# Patient Record
Sex: MALE | Race: WHITE | Employment: FULL TIME | URBAN - METROPOLITAN AREA
[De-identification: names, ages, dates, MRNs, and addresses within clinical notes are randomized per-mention and may not be internally consistent; named-entity substitution may affect disease eponyms.]

---

## 2018-08-28 ENCOUNTER — TRANSFERRED RECORDS (OUTPATIENT)
Dept: HEALTH INFORMATION MANAGEMENT | Facility: CLINIC | Age: 43
End: 2018-08-28

## 2021-01-14 ENCOUNTER — TRANSFERRED RECORDS (OUTPATIENT)
Dept: HEALTH INFORMATION MANAGEMENT | Facility: CLINIC | Age: 46
End: 2021-01-14

## 2021-04-07 ENCOUNTER — MEDICAL CORRESPONDENCE (OUTPATIENT)
Dept: HEALTH INFORMATION MANAGEMENT | Facility: CLINIC | Age: 46
End: 2021-04-07

## 2021-04-07 ENCOUNTER — TRANSFERRED RECORDS (OUTPATIENT)
Dept: HEALTH INFORMATION MANAGEMENT | Facility: CLINIC | Age: 46
End: 2021-04-07

## 2021-04-13 ENCOUNTER — TELEPHONE (OUTPATIENT)
Dept: OTHER | Facility: CLINIC | Age: 46
End: 2021-04-13

## 2021-04-13 NOTE — TELEPHONE ENCOUNTER
Referral received via fax on 4/7/21.  Sent to [facility] to be scanned.    Pt referred to [facility] by Dr. José Lu ([facility]) for DVT followup.    Per review of notes, hx of 3 blood clots, most recent left leg DVT, on blood thinners since January 2021.    Pt needs to be scheduled for in person consult with Vascular Medicine.  Will route to scheduling to coordinate an appointment at next available.    Appt note: Ref by Dr. José Lu ([facility]) for DVT followup; hx of 3 blood clots, most recent left leg DVT, on blood thinners since January 2021.    Elizabeth Davalos, TRACIN, RN-[facility] [place]

## 2021-04-28 ENCOUNTER — OFFICE VISIT (OUTPATIENT)
Dept: OTHER | Facility: CLINIC | Age: 46
End: 2021-04-28
Attending: INTERNAL MEDICINE

## 2021-04-28 VITALS
WEIGHT: 189 LBS | OXYGEN SATURATION: 97 % | BODY MASS INDEX: 26.46 KG/M2 | SYSTOLIC BLOOD PRESSURE: 119 MMHG | HEART RATE: 50 BPM | HEIGHT: 71 IN | DIASTOLIC BLOOD PRESSURE: 78 MMHG | RESPIRATION RATE: 16 BRPM

## 2021-04-28 DIAGNOSIS — I83.893 VARICOSE VEINS OF BILATERAL LOWER EXTREMITIES WITH OTHER COMPLICATIONS: ICD-10-CM

## 2021-04-28 DIAGNOSIS — Z86.711 HISTORY OF PULMONARY EMBOLISM: ICD-10-CM

## 2021-04-28 DIAGNOSIS — G08 DURAL VENOUS SINUS THROMBOSIS: ICD-10-CM

## 2021-04-28 DIAGNOSIS — I82.552 CHRONIC DEEP VEIN THROMBOSIS (DVT) OF LEFT PERONEAL VEIN (H): Primary | ICD-10-CM

## 2021-04-28 PROCEDURE — 99205 OFFICE O/P NEW HI 60 MIN: CPT | Performed by: INTERNAL MEDICINE

## 2021-04-28 PROCEDURE — G0463 HOSPITAL OUTPT CLINIC VISIT: HCPCS

## 2021-04-28 RX ORDER — MULTIPLE VITAMINS W/ MINERALS TAB 9MG-400MCG
1 TAB ORAL DAILY
COMMUNITY

## 2021-04-28 RX ORDER — ERGOCALCIFEROL 1.25 MG/1
50000 CAPSULE, LIQUID FILLED ORAL DAILY
COMMUNITY

## 2021-04-28 SDOH — HEALTH STABILITY: MENTAL HEALTH: HOW OFTEN DO YOU HAVE A DRINK CONTAINING ALCOHOL?: NOT ASKED

## 2021-04-28 SDOH — HEALTH STABILITY: MENTAL HEALTH: HOW OFTEN DO YOU HAVE 6 OR MORE DRINKS ON ONE OCCASION?: NOT ASKED

## 2021-04-28 SDOH — HEALTH STABILITY: MENTAL HEALTH: HOW MANY STANDARD DRINKS CONTAINING ALCOHOL DO YOU HAVE ON A TYPICAL DAY?: NOT ASKED

## 2021-04-28 ASSESSMENT — MIFFLIN-ST. JEOR: SCORE: 1764.43

## 2021-04-28 NOTE — PROGRESS NOTES
"Jose Barajas is a 45 year old male who presents for:  Chief Complaint   Patient presents with     RECHECK      Ref by Dr. José Lu ([facility]) for DVT followup; hx of 3 blood clots, most recent left leg DVT, on blood thinners since January 2021 *nh        Vitals:    Vitals:    04/28/21 1043 04/28/21 1044   BP: 119/71 119/78   BP Location: Right arm Left arm   Patient Position: Chair Chair   Cuff Size: Adult Regular Adult Regular   Pulse: 50    Resp: 16    SpO2: 97%    Weight: 189 lb (85.7 kg)    Height: 5' 11\" (1.803 m)        BMI:  Estimated body mass index is 26.36 kg/m  as calculated from the following:    Height as of this encounter: 5' 11\" (1.803 m).    Weight as of this encounter: 189 lb (85.7 kg).    Pain Score:  Data Unavailable        Sherri Costa CMA    "

## 2021-04-28 NOTE — PROGRESS NOTES
[facility] INITIAL VASCULAR MEDICINE CONSULT    ( NEW PATIENT VISIT)      PRIMARY HEALTH CARE PROVIDER:  José Lu Edward, MD      REFERRING HEALTH CARE PROVIDER;   José Lu Edward, MD      REASON FOR CONSULT:  Evaluation and management of recurrent DVT and PE with HX of dural sinus thrombosis in early 2000 treated with warfarin.       HPI: Jose Barajas is a 45 year old very pleasant male new patient visit here today for evaluation and management of multiple thromboembolic events.  Initially developed dural venous sinus thrombosis in early 2000's evaluated extensively [facility] per patient all of the work-up negative the even sent some of the special labs to [place] etc. and he took warfarin for 6+ months and then he did well.  He developed pulmonary embolism in 2018 and treated with warfarin for 6+ months at that time and new DVT at the time.  Limited hypercoagulable studies at the time negative at [facility]. he recently fractured his left foot and developed below-knee DVT in the peroneal vein January 2021 and taking Eliquis 5 mg twice a day since then and repeat venous duplex in April 8, 2021 DVT resolved.    He is non-smoker, no family history for first-degree relatives with a DVT or PE  No personal history of malignancy      Reviewed available records in the [facility] and care everywhere and updated chart    PAST MEDICAL HISTORY  Past Medical History:   Diagnosis Date     Chronic deep vein thrombosis (DVT) of left peroneal vein (H), Recurrent      Dural venous sinus thrombosis      Other pulmonary embolism without acute cor pulmonale (H)        CURRENT MEDICATIONS  apixaban ANTICOAGULANT (ELIQUIS) 5 MG tablet, Take 5 mg by mouth  calcium carbonate-vitamin D (OS-CORINNE) 500-400 MG-UNIT tablet, Take 1 tablet by mouth daily  multivitamin w/minerals (MULTI-VITAMIN) tablet, Take 1 tablet by mouth daily  vitamin D2 (ERGOCALCIFEROL) 09599 units (1250 mcg) capsule, Take  50,000 Units by mouth daily    No current facility-administered medications on file prior to visit.       PAST SURGICAL HISTORY:  Past Surgical History:   Procedure Laterality Date     OTHER SURGICAL HISTORY Left     foot FX Nov 2020 , closed reduction.       ALLERGIES   No Known Allergies    FAMILY HISTORY  Family History   Problem Relation Age of Onset     Cerebrovascular Disease Paternal Grandmother         age 89     Cerebrovascular Disease Paternal Uncle         80s age       VASCULAR FAMILY HISTORY  1st order relative with atherosclerotic PAD: No  1st order relative with AAA: No  Family history of Familial Hyperlipidemia No  Family History of Hypercoagulable state:No    VASCULAR RISK FACTORS  1. Diabetes:No   2. Smoking: has never smoked.  3. HTN: normotensive  4.Hyperlipidemia: No      SOCIAL HISTORY  Social History     Socioeconomic History     Marital status: Single     Spouse name: Not on file     Number of children: Not on file     Years of education: Not on file     Highest education level: Not on file   Occupational History     Not on file   Social Needs     Financial resource strain: Not on file     Food insecurity     Worry: Not on file     Inability: Not on file     Transportation needs     Medical: Not on file     Non-medical: Not on file   Tobacco Use     Smoking status: Never Smoker     Smokeless tobacco: Never Used   Substance and Sexual Activity     Alcohol use: Not on file     Comment: socially     Drug use: Never     Sexual activity: Not on file   Lifestyle     Physical activity     Days per week: Not on file     Minutes per session: Not on file     Stress: Not on file   Relationships     Social connections     Talks on phone: Not on file     Gets together: Not on file     Attends Alevism service: Not on file     Active member of club or organization: Not on file     Attends meetings of clubs or organizations: Not on file     Relationship status: Not on file     Intimate partner violence      "Fear of current or ex partner: Not on file     Emotionally abused: Not on file     Physically abused: Not on file     Forced sexual activity: Not on file   Other Topics Concern     Not on file   Social History Narrative     Not on file       ROS:   General: No change in weight, sleep or appetite.  Normal energy.  No fever or chills  Eyes: Negative for vision changes or eye problems  ENT: No problems with ears, nose or throat.  No difficulty swallowing.  Resp: No coughing, wheezing or shortness of breath  CV: No chest pains or palpitations  GI: No nausea, vomiting,  heartburn, abdominal pain, diarrhea, constipation or change in bowel habits  : No urinary frequency or dysuria, bladder or kidney problems  Musculoskeletal: No significant muscle or joint pains  Neurologic: No headaches, numbness, tingling, weakness, problems with balance or coordination  Psychiatric: No problems with anxiety, depression or mental health  Heme/immune/allergy: No history of bleeding or clotting problems or anemia.  No allergies or immune system problems  Endocrine: No history of thyroid disease, diabetes or other endocrine disorders  Skin: No rashes,worrisome lesions or skin problems  Vascular:  No claudication, lifestyle limiting or otherwise; no ischemic rest pain; no non-healing ulcers. No weakness, No loss of sensation    History of left lower extremity peroneal vein DVT developed in January 2021 after foot fracture with closed reduction  Bilateral lower extremity varicose veins no previous intervention    EXAM:  /78 (BP Location: Left arm, Patient Position: Chair, Cuff Size: Adult Regular)   Pulse 50   Resp 16   Ht 5' 11\" (1.803 m)   Wt 189 lb (85.7 kg)   SpO2 97%   BMI 26.36 kg/m    In general, the patient is a pleasant male in no apparent distress.    HEENT: NC/AT.  PERRLA.  EOMI.  Sclerae white, not injected.  Nares clear.  Pharynx without erythema or exudate.  Dentition intact.    Neck: No adenopathy.  No " thyromegaly. Carotids +2/2 bilaterally without bruits.  No jugular venous distension.   Heart: RRR. Normal S1, S2 splits physiologically. No murmur, rub, click, or gallop. The PMI is in the 5th ICS in the midclavicular line. There is no heave.    Lungs: CTA.  No ronchi, wheezes, rales.  No dullness to percussion.   Abdomen: Soft, nontender, nondistended. No organomegaly. No AAA.  No bruits.   Extremities: Vascular:  Bilateral palpable symmetrical femoral popliteal, DP and PT pulses  Bilateral lower extremity varicose veins with CEAP 4 CVI  No foot ulcers or leg ulcers  No leg edema      Labs:    Procedures:   HISTORY:  Dyspnea, cough, pleuritic chest pain.    TECHNIQUE:  Intravenous contrast enhanced CT of the chest. 72 mL of Omnipaque 350 intravenous contrast administered.    COMPARISON:  Chest radiograph 08/20/2018.    FINDINGS:  The study is positive for pulmonary emboli. Filling defects compatible with pulmonary emboli are noted within right lower lobe posterior subsegmental pulmonary arterial branches on images #190 through 199 of series 4 for example. There is also a filling defect compatible pulmonary embolism within the inferior lingular segmental to subsegmental pulmonary arterial branches as seen on image #142-165 series 4. There is no large or central pulmonary embolus. No thoracic aortic aneurysm or dissection. No significant pericardial effusion.  -  Combination of atelectasis and consolidation within the left greater than right lower lobes. Additional patchy areas of centrilobular opacity within both lungs likely indicating small airways disease. There is no pneumothorax. A trace amount of left pleural fluid is present bilaterally. Mildly prominent left hilar lymph node on image #151 of series 4 measures approximately 1 cm size.   -  No acute bony abnormality.    IMPRESSION:  1. Small bilateral pulmonary emboli.  2. Combination of consolidation and atelectasis within the lower lobes, likely related to  pneumonia. These are more extensive than the pulmonary parenchymal distributions supplied by the small vessels affected by pulmonary emboli.   3. Additional areas of centrilobular opacity within both lungs suggesting small airways disease and endobronchial spread of infection or infected distal airways disease.  4. Mild left hilar adenopathy.  5. Findings were discussed with Dr. Rosales on 08/22/2018 at 7:54 hours.    Please note that all CT scans at this facility use dose modulation, iterative reconstruction, and/or weight-based dosing when appropriate to reduce radiation dose to as low as reasonably achievable.    Dictated by Daniel Kimble MD @ Aug 22 2018  7:45AM    Signed by Dr. Daniel Kimble @ Aug 22 2018  7:57AM      [facility] 4/8/2021  Reviewed outside imaging sudies in the epic and scanned reports etc.    INDICATION:  History of PE and left peroneal vein thrombus.    TECHNIQUE:  Grayscale two-dimensional ultrasound without and with compression as well as color-flow and spectral Doppler of the left lower extremity veins.    COMPARISON:  Left lower extremity venous Doppler study of 01/14/2021.     FINDINGS:  Normal compressibility of and flow within the left common femoral, superficial femoral, popliteal, posterior tibial, profunda, and greater saphenous veins is demonstrated.  No thrombus is identified.  The right common femoral vein is clear.    IMPRESSION:  Negative left lower extremity venous Doppler study.      Dictated by Sean Alberts MD @ Apr 8 2021  3:48PM    INDICATION:  Left leg pain and swelling     TECHNIQUE:  Ultrasound venous duplex lower left extremity.  Compression venous exam was performed using gray-scale and color Doppler imaging.     COMPARISON:  August 28, 2018    FINDINGS:  Sonographic imaging demonstrates DVT within the left peroneal vein extending from the mid to distal calf.     The left common femoral, superficial femoral, popliteal, posterior tibial and greater saphenous and the  contralateral right common femoral veins are compressible with normal color Doppler blood flow.         IMPRESSION:  DVT within the left peroneal vein. Findings discussed with AYE Zarate at 6:33 p.m. on January 14, 2021.    Assessment and Plan:    1. Chronic deep vein thrombosis (DVT) of left peroneal vein (H) ( HX of recurrent DVT) currently on eliquis     2. History of pulmonary embolism, 8/2018 ( treated with warfarin 6 plus months)     3.  Hx of Dural venous sinus thrombosis ( treated with warfarin 6 plus months extensive w/u negative at that time)     4.  Bilateral lower extremity varicose veins CEAP 4 CVI      This is a very pleasant 45-year-old male with history of recurrent thromboembolic events early 2000's dural venous sinus thrombosis treated with warfarin for 6+ months then followed by 2018 pulmonary embolism treated with warfarin for 6+ months and hypercoagulable studies are negative and he fractured his left foot and in January 2021 developed below-knee DVT peroneal vein started Eliquis and now clot is resolved.  Non-smoker no family history of hypercoagulable status no personal history of malignancy.  Discussed recurrent thromboembolism and options of treatment with the patient.  He is a long-term/lifetime candidate for anticoagulation.  Responded well with 3-month duration of standard dose of Eliquis 5 mg twice a day now the clot is resolved  His exam is unremarkable except bilateral lower extremity varicose veins with venous insufficiency    At present my recommendations,  Decrease the dose of Eliquis 2.5 mg twice a day and continue and he is a long-term/lifetime candidate (start low-dose after completion of current supply of Eliquis)  New prescription sent  No need for hypercoagulable studies (recurrent thromboembolic events, previous hypercoagulable studies are negative)  Use compression stockings 20 to 30 mmHg knee-high daytime and elevate leg when able  Return to clinic on as needed  basis    Thank you for the consultation !  This note was dictated by utilizing Dragon software    Copy of this note to primary care physician    Greater than 60 minutes spent on the date of the encounter doing chart review, history and exam, documentation, and further activities as noted above.    Michelle Resendiz MD, FAHA, FS, FNLA, [facility]  Vascular Medicine  Clinical hypertension specialist  Clinical lipidologist    Greater than 60 minutes spent on the date of the encounter doing chart review, history and exam, documentation, and further activities as noted above.

## 2021-04-28 NOTE — PATIENT INSTRUCTIONS
1. After completion of current supply of eliquis 5 mg tabs , take low dose 2.5 mg twice a day , new RX sent    2. Use compression stockings, knee high 20-30 day time     3. You are a long term/life time candidate for blood thinner due to recurrent clots at various places.

## 2021-12-12 ENCOUNTER — HEALTH MAINTENANCE LETTER (OUTPATIENT)
Age: 46
End: 2021-12-12

## 2022-10-03 ENCOUNTER — HEALTH MAINTENANCE LETTER (OUTPATIENT)
Age: 47
End: 2022-10-03

## 2023-02-11 ENCOUNTER — HEALTH MAINTENANCE LETTER (OUTPATIENT)
Age: 48
End: 2023-02-11

## 2024-03-09 ENCOUNTER — HEALTH MAINTENANCE LETTER (OUTPATIENT)
Age: 49
End: 2024-03-09